# Patient Record
Sex: MALE | Race: BLACK OR AFRICAN AMERICAN | Employment: FULL TIME | URBAN - METROPOLITAN AREA
[De-identification: names, ages, dates, MRNs, and addresses within clinical notes are randomized per-mention and may not be internally consistent; named-entity substitution may affect disease eponyms.]

---

## 2022-10-22 ENCOUNTER — HOSPITAL ENCOUNTER (EMERGENCY)
Age: 37
Discharge: HOME | End: 2022-10-22

## 2022-10-22 ENCOUNTER — APPOINTMENT (OUTPATIENT)
Dept: CT IMAGING | Age: 37
End: 2022-10-22

## 2022-10-22 VITALS
SYSTOLIC BLOOD PRESSURE: 125 MMHG | OXYGEN SATURATION: 99 % | DIASTOLIC BLOOD PRESSURE: 89 MMHG | RESPIRATION RATE: 16 BRPM | TEMPERATURE: 98.4 F | WEIGHT: 232 LBS | HEIGHT: 74 IN | HEART RATE: 72 BPM | BODY MASS INDEX: 29.77 KG/M2

## 2022-10-22 DIAGNOSIS — G43.809 OTHER MIGRAINE WITHOUT STATUS MIGRAINOSUS, NOT INTRACTABLE: Primary | ICD-10-CM

## 2022-10-22 PROCEDURE — 6360000002 HC RX W HCPCS: Performed by: PHYSICIAN ASSISTANT

## 2022-10-22 PROCEDURE — 70450 CT HEAD/BRAIN W/O DYE: CPT

## 2022-10-22 PROCEDURE — 96374 THER/PROPH/DIAG INJ IV PUSH: CPT

## 2022-10-22 PROCEDURE — 2580000003 HC RX 258: Performed by: PHYSICIAN ASSISTANT

## 2022-10-22 PROCEDURE — 99284 EMERGENCY DEPT VISIT MOD MDM: CPT

## 2022-10-22 PROCEDURE — 96375 TX/PRO/DX INJ NEW DRUG ADDON: CPT

## 2022-10-22 RX ORDER — DEXAMETHASONE SODIUM PHOSPHATE 10 MG/ML
10 INJECTION INTRAMUSCULAR; INTRAVENOUS ONCE
Status: COMPLETED | OUTPATIENT
Start: 2022-10-22 | End: 2022-10-22

## 2022-10-22 RX ORDER — METOCLOPRAMIDE HYDROCHLORIDE 5 MG/ML
10 INJECTION INTRAMUSCULAR; INTRAVENOUS ONCE
Status: COMPLETED | OUTPATIENT
Start: 2022-10-22 | End: 2022-10-22

## 2022-10-22 RX ORDER — DIPHENHYDRAMINE HYDROCHLORIDE 50 MG/ML
25 INJECTION INTRAMUSCULAR; INTRAVENOUS ONCE
Status: COMPLETED | OUTPATIENT
Start: 2022-10-22 | End: 2022-10-22

## 2022-10-22 RX ORDER — KETOROLAC TROMETHAMINE 30 MG/ML
30 INJECTION, SOLUTION INTRAMUSCULAR; INTRAVENOUS ONCE
Status: COMPLETED | OUTPATIENT
Start: 2022-10-22 | End: 2022-10-22

## 2022-10-22 RX ORDER — BUTALBITAL, ACETAMINOPHEN AND CAFFEINE 50; 325; 40 MG/1; MG/1; MG/1
1 TABLET ORAL EVERY 4 HOURS PRN
Qty: 20 TABLET | Refills: 3 | Status: SHIPPED | OUTPATIENT
Start: 2022-10-22

## 2022-10-22 RX ORDER — 0.9 % SODIUM CHLORIDE 0.9 %
1000 INTRAVENOUS SOLUTION INTRAVENOUS ONCE
Status: COMPLETED | OUTPATIENT
Start: 2022-10-22 | End: 2022-10-22

## 2022-10-22 RX ADMIN — DIPHENHYDRAMINE HYDROCHLORIDE 25 MG: 50 INJECTION, SOLUTION INTRAMUSCULAR; INTRAVENOUS at 19:26

## 2022-10-22 RX ADMIN — KETOROLAC TROMETHAMINE 30 MG: 30 INJECTION, SOLUTION INTRAMUSCULAR at 19:27

## 2022-10-22 RX ADMIN — SODIUM CHLORIDE 1000 ML: 9 INJECTION, SOLUTION INTRAVENOUS at 19:25

## 2022-10-22 RX ADMIN — METOCLOPRAMIDE 10 MG: 5 INJECTION, SOLUTION INTRAMUSCULAR; INTRAVENOUS at 19:25

## 2022-10-22 RX ADMIN — DEXAMETHASONE SODIUM PHOSPHATE 10 MG: 10 INJECTION INTRAMUSCULAR; INTRAVENOUS at 20:19

## 2022-10-22 ASSESSMENT — PAIN DESCRIPTION - LOCATION
LOCATION: HEAD
LOCATION: HEAD

## 2022-10-22 ASSESSMENT — PAIN DESCRIPTION - ORIENTATION
ORIENTATION: LEFT
ORIENTATION: LEFT

## 2022-10-22 ASSESSMENT — PAIN DESCRIPTION - PAIN TYPE: TYPE: ACUTE PAIN

## 2022-10-22 ASSESSMENT — PAIN SCALES - GENERAL
PAINLEVEL_OUTOF10: 8
PAINLEVEL_OUTOF10: 7

## 2022-10-22 ASSESSMENT — PAIN DESCRIPTION - ONSET: ONSET: ON-GOING

## 2022-10-22 ASSESSMENT — PAIN - FUNCTIONAL ASSESSMENT: PAIN_FUNCTIONAL_ASSESSMENT: 0-10

## 2022-10-22 ASSESSMENT — PAIN DESCRIPTION - DESCRIPTORS: DESCRIPTORS: DISCOMFORT;SHARP

## 2022-10-22 ASSESSMENT — PAIN DESCRIPTION - FREQUENCY: FREQUENCY: INTERMITTENT

## 2022-10-22 NOTE — ED PROVIDER NOTES
[facility]                                                                                                                                      Department of Emergency Medicine   ED  Provider Note  Admit Date/RoomTime: 10/22/2022  6:02 PM  ED Room: 30/30        HPI:  10/22/22,   Time: 6:17 PM EDT         Michelle Ewing is a 42 y.o. male presenting to the ED for left sided HA with vision changes, beginning 2 days ago. The complaint has been intermittent, severe in severity, and worsened by nothing. Patient states that he has a history of migraine headaches. He used to follow-up with Dr. Marry Gaspar years ago. He states that the pains seem to go away and he really had not had any headaches for a few years. The patient states that over the past few weeks he started having headaches once again. He reports the pain is left-sided and starts in the posterior occipital region radiating forward to the left temple. He states the pain feels sharp at times and often wakes him from sleep. The patient is here today because the headache got much worse yesterday and again today. His wife states that he was shaking and crying with pain. He denies any nausea or vomiting. He did have some vision changes now resolved. The patient denies any fall or trauma. No recent upper respiratory symptoms to report. He did get seen by his PCP last week and was given a triptan medication. The patient states that does not seem to have helped the pain at all. The patient denies fever or chills.         ROS:     Constitutional: Negative for fever and chills  HENT:  See HPI  Eyes: Negative for pain, discharge and redness  Respiratory:  Negative for shortness of breath, cough and wheezing  Cardiovascular: Negative for CP, edema or palpitations  Gastrointestinal: Negative for nausea, vomiting, diarrhea and abdominal distention  Genitourinary: Negative for dysuria and frequency  Musculoskeletal: Negative for back pain and arthralgia  Skin: Negative for rash and wound  Neurological:  See HPI  Hematological: Negative for adenopathy    All other systems reviewed and are negative      -------------------------------- PAST HISTORY ----------------------------------  Past Medical History:  has a past medical history of Chronic tension headaches and Migraine. Past Surgical History:  has a past surgical history that includes Dental surgery; hernia repair (2001 and 2002); and shoulder surgery (Left, Nov 2015). Social History:  reports that he has never smoked. He has never used smokeless tobacco. He reports that he does not drink alcohol and does not use drugs. Family History: family history includes Diabetes in his mother; Hypotension in his mother; Migraines in his mother; Mult Sclerosis in his mother. The patients home medications have been reviewed. Allergies: Patient has no known allergies. --------------------------------- RESULTS ------------------------------------------  All laboratory and radiology results have been personally reviewed by myself   LABS:  No results found for this visit on 10/22/22. RADIOLOGY:  Interpreted by Radiologist.  [address] [place] [address]   Final Result   No acute intracranial abnormality.             ----------------- NURSING NOTES AND VITALS REVIEWED ---------------   The nursing notes within the ED encounter and vital signs as below have been reviewed. /89   Pulse 72   Temp 98.4 °F (36.9 °C) (Oral)   Resp 16   Ht 6' 2\" (1.88 m)   Wt 232 lb (105.2 kg)   SpO2 99%   BMI 29.79 kg/m²   Oxygen Saturation Interpretation: Normal      --------------------------------PHYSICAL EXAM------------------------------------      Constitutional/General: Alert and oriented x3, well appearing, non toxic in NAD  Head: NC/AT  Eyes: PERRL, EOMI  TM's intact and clear. Posterior pharynx unremarkable.     Mouth: Oropharynx clear, handling secretions, no trismus  Neck: Supple, full ROM, no meningeal signs  Pulmonary: Lungs clear to auscultation bilaterally, no wheezes, rales, or rhonchi. Not in respiratory distress  Cardiovascular:  Regular rate and rhythm, no murmurs, gallops, or rubs. 2+ distal pulses  Abdomen: Soft, + BS. No distension. Nontender. No palpable rigidity, rebound or guarding  Extremities: Moves all extremities x 4. Warm and well perfused  Skin: warm and dry without rash  Neurologic: GCS 15,  Intact. No focal deficits  Psych: Normal Affect      ------------------------ ED COURSE/MEDICAL DECISION MAKING----------------------  Medications   0.9 % sodium chloride bolus (1,000 mLs IntraVENous New Bag 10/22/22 1925)   dexamethasone (DECADRON) injection 10 mg (has no administration in time range)   ketorolac (TORADOL) injection 30 mg (30 mg IntraVENous Given 10/22/22 1927)   diphenhydrAMINE (BENADRYL) injection 25 mg (25 mg IntraVENous Given 10/22/22 1926)   metoclopramide (REGLAN) injection 10 mg (10 mg IntraVENous Given 10/22/22 1925)         Medical Decision Making:    CT head is unremarkable. No acute findings. Given IV fluids and HA cocktail here   On repeat exam patient is feeling much better, decreased pain reported. Plan to give dose of Decadron to prevent rebound HA. Discharge home with Fioricet PRN. I do suggest follow-up with PCP as well. Consider neurology consult. Counseling: The emergency provider has spoken with the patient and discussed todays results, in addition to providing specific details for the plan of care and counseling regarding the diagnosis and prognosis. Questions are answered at this time and they are agreeable with the plan.      ------------------------ IMPRESSION AND DISPOSITION -------------------------------    IMPRESSION  1.  Other migraine without status migrainosus, not intractable        DISPOSITION  Disposition: Discharge to home  Patient condition is stable                    Kodi Angelo PA-C  10/22/22 2013

## 2022-12-22 ENCOUNTER — HOSPITAL ENCOUNTER (EMERGENCY)
Age: 37
Discharge: HOME | End: 2022-12-22

## 2022-12-22 VITALS
HEART RATE: 62 BPM | RESPIRATION RATE: 18 BRPM | SYSTOLIC BLOOD PRESSURE: 142 MMHG | DIASTOLIC BLOOD PRESSURE: 100 MMHG | TEMPERATURE: 98.2 F | HEIGHT: 75 IN | WEIGHT: 230 LBS | OXYGEN SATURATION: 97 % | BODY MASS INDEX: 28.6 KG/M2

## 2022-12-22 DIAGNOSIS — S60.459A WOOD SPLINTER UNDER FINGERNAIL: Primary | ICD-10-CM

## 2022-12-22 PROCEDURE — 10120 INC&RMVL FB SUBQ TISS SMPL: CPT

## 2022-12-22 PROCEDURE — 99283 EMERGENCY DEPT VISIT LOW MDM: CPT

## 2022-12-22 RX ORDER — CEPHALEXIN 500 MG/1
500 CAPSULE ORAL 2 TIMES DAILY
Qty: 14 CAPSULE | Refills: 0 | Status: SHIPPED | OUTPATIENT
Start: 2022-12-22 | End: 2022-12-29

## 2022-12-22 ASSESSMENT — PAIN SCALES - GENERAL: PAINLEVEL_OUTOF10: 6

## 2022-12-22 ASSESSMENT — PAIN - FUNCTIONAL ASSESSMENT: PAIN_FUNCTIONAL_ASSESSMENT: 0-10

## 2022-12-23 NOTE — ED PROVIDER NOTES
[facility]       Department of Emergency Medicine   ED  Encounter Note  Admit Date/RoomTime: 2022  6:27 PM  ED Room: /37  NAME: Kris Cano  : 1985  MRN: [id number]    CHIEF COMPLAINT     Finger Injury (splinter under nail, onset today, finger feels warm )    HISTORY OF PRESENT ILLNESS        Kris Cano is a 42 y.o. male who presents to the ED with a large splinter underneath his fingernail. Patient states that he attempted to remove the splinter and may have pushed it in further and could not grasp it. Patient stated that this had happened today just an hour before time of examination. Patient denies any other accompanying symptoms. REVIEW OF SYSTEMS     Pertinent positives and negatives are stated within HPI, all other systems reviewed and are negative. Past Medical History:  has a past medical history of Chronic tension headaches and Migraine. Surgical History:  has a past surgical history that includes Dental surgery; hernia repair ( and ); and shoulder surgery (Left, 2015). Social History:  reports that he has never smoked. He has never used smokeless tobacco. He reports that he does not drink alcohol and does not use drugs. Family History: family history includes Diabetes in his mother; Hypotension in his mother; Migraines in his mother; Mult Sclerosis in his mother. Allergies: Patient has no known allergies.   CURRENT MEDICATIONS       Previous Medications    BUTALBITAL-ACETAMINOPHEN-CAFFEINE (FIORICET, ESGIC) -40 MG PER TABLET    Take 1 tablet by mouth every 4 hours as needed for Headaches    KETOROLAC (TORADOL) 10 MG TABLET    Take 1 tablet by mouth every 8 hours as needed for Pain    SUMATRIPTAN (IMITREX) 100 MG TABLET    Take 1 tablet by mouth once as needed for Migraine       SCREENINGS     [place] Coma Scale  Eye Opening: Spontaneous  Best Verbal Response: Oriented  Best Motor Response: Obeys commands  Yamilka Coma Scale Score: 15 [facility] Assessment  BP: (!) 142/100  Heart Rate: 62       PHYSICAL EXAM   Oxygen Saturation Interpretation: Normal on room air analysis. ED Triage Vitals [12/22/22 1811]   BP Temp Temp src Heart Rate Resp SpO2 Height Weight   (!) 142/100 98.2 °F (36.8 °C) -- (!) 43 18 97 % 6' 3\" (1.905 m) 230 lb (104.3 kg)         Physical Exam  General: Awake alert and oriented. Well-appearing. Nontoxic. HEENT: Normocephalic, atraumatic, pupils equal.  Neck: Normal range of motion  Cardiac: Regular rate  Respiratory: Respirations easy, unlabored, no respiratory distress noted, no nasal flaring or use of accessory muscles. Abdomen: Nondistended  Musculoskeletal: Moves all extremities x4  Neuro: Nonlateralizing  Skin: Flesh tone, warm, dry. Right fourth digit underneath the fingernail there was a wood sliver there foreign body. Psych: Normal affect    DIAGNOSTIC RESULTS   (All laboratory and radiology results have been personally reviewed by myself)  Labs:  No results found for this visit on 12/22/22. Imaging: All Radiology results interpreted by Radiologist unless otherwise noted. No orders to display       ED COURSE   Vitals:    Vitals:    12/22/22 1811 12/22/22 1950   BP: (!) 142/100    Pulse: (!) 43 62   Resp: 18    Temp: 98.2 °F (36.8 °C)    SpO2: 97%    Weight: 230 lb (104.3 kg)    Height: 6' 3\" (1.905 m)        Patient was given the following medications:  Medications - No data to display       PROCEDURES     PROCEDURE  12/22/22       Time: [address] [place] [address]  Risks, benefits and alternatives (for applicable procedures below) described. Performed By: Nurse Practitioner on duty. Trent Kerr supervised by Ruby Edwards    Indication: Splinter underneath right fourth digit of fingernail. Location:   Right fourth digit of fingernail. Informed consent: verbal Consent. Prep: The skin was cleansed with povidone iodine and draped in a sterile fashion.   Local Anesthesia:  obtained with Lidocaine 1% without epinephrine. Foreign Body was: removed using forceps and had the appearance of wood. Ultrasound Guidance:   not applicable. Complications:  None. Patient tolerated the procedure well. REASSESSMENT   12/22/22       Time:   Patients condition . CONSULTS:  None  DIFFERENTIAL DX_MDM   MDM:  Patient presented to ED with splinter under fingernail. Procedure performed to remove splinter from fingernail. Digital block performed. Entire splinter removed. Patient was placed on antibiotics prophylactic. Patient states understanding of discharge instructions and reason for antibiotic. Plan of Care/Counseling:  ORESTES Bronson CNP reviewed today's visit with the patient in addition to providing specific details for the plan of care and counseling regarding the diagnosis and prognosis. Questions are answered at this time and are agreeable with the plan. ASSESSMENT     1. Wood splinter under fingernail Stable       DISPOSITION   Discharged home. Patient condition is good    NEW MEDICATIONS     New Prescriptions    CEPHALEXIN (KEFLEX) 500 MG CAPSULE    Take 1 capsule by mouth 2 times daily for 7 days     Electronically signed by ORESTES Bronson CNP   DD: 12/22/22  **This report was transcribed using voice recognition software. Every effort was made to ensure accuracy; however, inadvertent computerized transcription errors may be present.   END OF ED PROVIDER NOTE       ORESTES Webster CNP  12/22/22 2003

## 2022-12-23 NOTE — DISCHARGE INSTRUCTIONS
Antibiotics with food and be sure to take all antibiotics. Monitor for signs and symptoms of infection which would be redness, swelling, purulent drainage, and fever.

## 2025-05-25 ENCOUNTER — APPOINTMENT (OUTPATIENT)
Dept: GENERAL RADIOLOGY | Age: 40
End: 2025-05-25
Payer: COMMERCIAL

## 2025-05-25 ENCOUNTER — HOSPITAL ENCOUNTER (EMERGENCY)
Age: 40
Discharge: HOME | End: 2025-05-25
Attending: STUDENT IN AN ORGANIZED HEALTH CARE EDUCATION/TRAINING PROGRAM
Payer: COMMERCIAL

## 2025-05-25 VITALS
HEIGHT: 74 IN | BODY MASS INDEX: 28.88 KG/M2 | OXYGEN SATURATION: 99 % | TEMPERATURE: 97.5 F | SYSTOLIC BLOOD PRESSURE: 108 MMHG | RESPIRATION RATE: 16 BRPM | WEIGHT: 225 LBS | DIASTOLIC BLOOD PRESSURE: 60 MMHG | HEART RATE: 76 BPM

## 2025-05-25 DIAGNOSIS — S86.002A INJURY OF LEFT ACHILLES TENDON, INITIAL ENCOUNTER: Primary | ICD-10-CM

## 2025-05-25 PROCEDURE — 99283 EMERGENCY DEPT VISIT LOW MDM: CPT

## 2025-05-25 PROCEDURE — 6370000000 HC RX 637 (ALT 250 FOR IP): Performed by: STUDENT IN AN ORGANIZED HEALTH CARE EDUCATION/TRAINING PROGRAM

## 2025-05-25 PROCEDURE — 73610 X-RAY EXAM OF ANKLE: CPT

## 2025-05-25 RX ORDER — OXYCODONE AND ACETAMINOPHEN 5; 325 MG/1; MG/1
1 TABLET ORAL ONCE
Refills: 0 | Status: COMPLETED | OUTPATIENT
Start: 2025-05-25 | End: 2025-05-25

## 2025-05-25 RX ADMIN — OXYCODONE AND ACETAMINOPHEN 1 TABLET: 5; 325 TABLET ORAL at 02:25

## 2025-05-25 ASSESSMENT — PAIN DESCRIPTION - LOCATION: LOCATION: LEG

## 2025-05-25 ASSESSMENT — PAIN DESCRIPTION - ORIENTATION: ORIENTATION: LEFT

## 2025-05-25 ASSESSMENT — PAIN SCALES - GENERAL: PAINLEVEL_OUTOF10: 10

## 2025-05-25 ASSESSMENT — PAIN DESCRIPTION - DESCRIPTORS: DESCRIPTORS: ACHING

## 2025-05-25 NOTE — ED PROVIDER NOTES
[facility] EMERGENCY DEPARTMENT  EMERGENCY DEPARTMENT ENCOUNTER        Pt Name: June Matthews  MRN: [id number]  Birthdate 1985  Date of evaluation: 5/25/2025  Provider: Renée Ballard DO  PCP: Abdiaziz Wooten DO  Note Started: 2:07 AM EDT 5/25/25    CHIEF COMPLAINT       Chief Complaint   Patient presents with    Leg Injury     Pt was playing football today and thinks he ruptured his Achilles. 10/10 left leg pain.          HISTORY OF PRESENT ILLNESS: 1 or more Elements   History From: patient    Limitations to history : None    June Matthews is a 40 y.o. male who is Emergency Department complaining of left leg pain.  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  He complains of 10 out of 10 pain in his left leg near the Achilles tendon.  He states that he thinks that he injured his Achilles.  Patient describes pain as throbbing.  States that he took \"anything he could\" prior to coming here without any relief.  Patient states that he has not had surgery on his Achilles tendon in the past or on his ankle in the past.  Patient did not fall or hit his head.  States he was playing flag football when this occurred.  Denies any numbness, tingling, fevers, chills, unilateral weakness, abdominal pain, chest pain, shortness of breath, recent hospitalization, recent illness, or other acute symptoms or concerns.    Nursing Notes were all reviewed and agreed with or any disagreements were addressed in the HPI.    REVIEW OF SYSTEMS :      Review of Systems    Positives and Pertinent negatives as per HPI.     SURGICAL HISTORY     Past Surgical History:   Procedure Laterality Date    DENTAL SURGERY      HERNIA REPAIR  2001 and 2002    SHOULDER SURGERY Left Nov 2015       CURRENTMEDICATIONS       Discharge Medication List as of 5/25/2025  4:28 AM        CONTINUE these medications 
consistent carbohydrate (no snacks)/DASH/TLC (sodium and cholesterol restricted diet)/minced and moist

## 2025-05-29 RX ORDER — HYDROCODONE BITARTRATE AND ACETAMINOPHEN 5; 325 MG/1; MG/1
1 TABLET ORAL EVERY 8 HOURS PRN
Status: ON HOLD | COMMUNITY
End: 2025-06-03 | Stop reason: HOSPADM

## 2025-05-29 NOTE — PROGRESS NOTES
[facility] PRE-ADMISSION TESTING INSTRUCTIONS    The Preadmission Testing patient is instructed accordingly using the following criteria (check applicable):    ARRIVAL INSTRUCTIONS:  Arrive at 0530    [x] Parking the day of Surgery is located in the Main Entrance lot.  Upon entering the door, make an immediate right to the surgery reception desk    [x] Bring photo ID and insurance card    [x] Bring in a copy of Living will or Durable Power of  papers.    [x] Please be sure to arrange for a responsible adult to provide transportation to and from the hospital    [x] Please arrange for a responsible adult to be with you for the 24 hour period post procedure due to having anesthesia    [x] If you awake am of surgery not feeling well or have temperature >100 please call [phone]    GENERAL INSTRUCTIONS:    [x] No solid foods after midnight, no gum, candy or mints.   May have up to 8oz clear liquids up to 4 hours prior to procedure        [x] You may brush your teeth, do not swallow any toothpaste    [x] Take medications as instructed     [x] Stop herbal supplements and vitamins 5 days prior to procedure    [x] No tobacco products within 24 hours of surgery     [x] No alcohol or illegal drug use, marijuana included, within 24 hours of surgery.    [x] Shower or bath with soap, lather and rinse well, AM of Surgery, no lotion, powders or creams to surgical site    [x] Jewelry, body piercing's, eyeglasses, contact lenses and dentures are not permitted into surgery (bring cases)      [x] Please do not wear any nail polish, make up or hair products on the day of surgery    [x] You can expect a call the business day prior to procedure to notify you if your arrival time changes    [x] If you receive a survey after surgery we would greatly appreciate your comments    [x] Please notify surgeon if you develop any illness between now and time of surgery (cold, cough, sore throat, fever, nausea,

## 2025-06-02 ENCOUNTER — ANESTHESIA EVENT (OUTPATIENT)
Dept: OPERATING ROOM | Age: 40
End: 2025-06-02
Payer: COMMERCIAL

## 2025-06-02 NOTE — DISCHARGE INSTRUCTIONS
POST OPERATIVE DISCHARGE INSTRUCTIONS  Foot & Ankle Surgery    Diet:  1. Following your surgery you may progress to your normal diet as tolerated. If you experience nausea or vomiting, start with clear liquids and progress  from a soft diet to a normal diet as tolerated.    Activities:  1. Elevate your foot at heart level as much as possible for 7-10 days, then as needed to control swelling.    2. Weight bearing: You may not bear weight on your operative foot. Use crutches, walker, or knee-scooter when up.    Do not use the knee-scooter until all of the numbness has worn off of your operative leg.    Make sure the back of your heel is suspended in the air as much as possible to avoid any pressure to your heel.  The best way to do this is to keep a pillow or two behind your calf to keep your heel in the air.    Wound care:  1. You may not change your bandages.  2. Keep your bandages and incision clean and dry. Do not get incision wet.  3. If you choose to shower, you may sit on a shower chair or bench, extending the operative foot out of the shower area protecting it from getting wet.      Medications: SEE MEDICATION RECONCILIATION FORM  1. A local or regional anesthetic may be used at the time of surgery. This usually lasts several hours following surgery and should not interfere with your pain medication schedule.  2.  Medications have already been phoned in to the pharmacy for your pickup after surgery.  These medications will be called into the pharmacy we had on file at our office:      Other instructions:  1. You may apply ice to your operative extremity (behind your knee), on 30 minutes, off 30 minutes, as needed for comfort and to control swelling. Do not apply ice directly to skin.  _____________________________________________________________________      Resuming work or school: Resuming work or school is difficult to predict because of individual pain sensitivity and work activity demands on the  narcotics you should not have any additional Tylenol. If you are not taking narcotics then the Tylenol is OK to take but no more than 4000mg per day. Anti-inflammatory medications (such as Ibuprofen, Advil, Aleve, Celebrex etc.) interfere with bone healing. Therefore if you have a healing fracture, osteotomy (bone cut) or bone fusion you should not be taking these medications. If you do not have any of the above bone healing conditions then it is OK to use these medications if you do not have any other medical contraindications.     My pain medication is making me nauseous. What should I do?     First make sure you are not taking it on an empty stomach. Always eat some bland food before you take your pain medication. An antinausea medication is usually given to you at the time of surgery. If you did not get it please call and we can call it in to your pharmacy. If you have it take it as directed. If it does not work and you are vomiting stop the narcotic medication and call the office. Usually in this situation we have to change the pain medication.     If drainage through the dressing what should I do?     If you are within the first two weeks after surgery then you should \"reinforce the dressing\". This means you should add as much gauze as needed and then cover it with   another ace bandage. Use a plastic bag under the area to keep other items from being soiled. Large foot and ankle surgeries can drain quite a bit but it is rarely a sign that something bad is happening.      What should I do if my toes change color (blue or white) and do not return to normal after elevation?     Initially you should keep the foot at heart level and then call the office as soon as possible for instructions.     Do I change the dressing?     Do not change the dressing unless you are given specific instructions to do so on your postoperative instruction sheet or by a member of the clinical staff.     Can I use the [facility] care beyond 3

## 2025-06-02 NOTE — H&P
I have reviewed the most recent H&P (below), examined the patient, and there are no significant changes.  MRI L ankle images and report reviewed (report below).  Ok to proceed with surgical intervention.  ________    MRI L Ankle WO 5/29/25    FINDINGS:   Tendons: Full-thickness tear of the distal Achilles tendon centered 5.4 cm cranial to  the calcaneal attachment. There is retraction of the proximal tendon fibers with a  tendon gap of 2.8 cm. Fluid and edema adjacent to the torn fibers. Diffuse  thickening of the distal tendon fibers consistent with high-grade tendinosis/partial  tear. The flexor, extensor, and peroneal tendons are intact.  Ligaments: The medial deltoid and spring ligaments are intact. The anterior and  posterior tibiofibular ligaments are intact. The anterior and posterior talofibular  ligaments and calcaneofibular ligament are intact. The sinus tarsi is intact.  Bone/cartilage: No acute fracture or dislocation. No ankle effusion. No  osteochondral lesion of the talus. The tibiotalar and subtalar articulations are  maintained.  Soft tissues: Diffuse subcutaneous soft tissue edema in the posterior aspect of the  ankle. No discrete drainable fluid collection. Visualized muscles demonstrate normal  bulk and signal The tarsal tunnel is intact.    IMPRESSION:  1. Full-thickness tear of the distal Achilles tendon centered 5.4 cm cranial to the  calcaneal attachment. There is retraction of the proximal tendon fibers with a tendon  gap of 2.8 cm. Fluid and edema adjacent to the torn fibers. Diffuse thickening of the  distal tendon fibers consistent with high-grade tendinosis/partial tear.   2. Diffuse subcutaneous soft tissue edema in the posterior aspect of the ankle  ________       Chief Complaint     Left Achilles     Subjective     Patient presents today as a new patient encounter regarding an injury to his left ankle.  Injury occurred on 5/24/2025.  Patient relates he was playing flag football at the    Neurologic: Positive for NONE.    Psychiatric: Positive for NONE.    Heme/Lymphatic: Positive for NONE.    Allergic/Immunologic: Positive for NONE.    The remainder of the complete review of systems was negative.      Objective     GENERAL:       The patient's general appearance was well nourished, well hydrated, no acute distress.  Orientation was alert and oriented x 3.  The patient's mood was normal.  Pulmonary exam shows normal air exchange, no labored breathing, or shortness of breath.  A lymphatic exam revealed no adenopathy in the area of examination.     LEFT FOOT AND ANKLE:       Dermatologically, the patient's soft tissue envelope was fully intact without open lesions.  There are no clinical signs of infection including negative for erythema, purulence, malodor, or lymphangitis.  There are no rashes.  There is mild bruising to the Achilles and posterior ankle.    Sensation intact to light touch for tibial, saphenous, superficial and deep peroneal, and sural nerve distributions.  +2/4 palpable dorsalis pedis and posterior tibial pulses.  Brisk capillary refill time to all digits.    Musculoskeletal exam reveals 3/5 strength in dorsiflexion, eversion, inversion, FHL, EHL.  2/5 strength in plantarflexion.    Ankle ROM: hyperdorsiflexion  Subtalar ROM:  full  First MTP ROM:  full  Lesser MTP ROM:  full    Gait exam:  nonweightbearing with crutches    There is tenderness and mild swelling to the posterior ankle along the Achilles midsubstance.  There is a palpable discontinuity to the Achilles midsubstance.  Positive Rhodes's.  No significant tenderness to the proximal Achilles at the level of the myotendinous junction or to the distal Achilles at the level of its insertion.  Otherwise, no significant tenderness with circumferential palpation of the forefoot, midfoot, hindfoot, or ankle.    Pes planovalgus deformity.    RIGHT FOOT AND ANKLE:       Dermatologically, the patient's soft tissue envelope was

## 2025-06-03 ENCOUNTER — APPOINTMENT (OUTPATIENT)
Age: 40
End: 2025-06-03
Attending: INTERNAL MEDICINE
Payer: COMMERCIAL

## 2025-06-03 ENCOUNTER — HOSPITAL ENCOUNTER (OUTPATIENT)
Age: 40
Setting detail: OUTPATIENT SURGERY
Discharge: HOME | End: 2025-06-03
Attending: PODIATRIST | Admitting: PODIATRIST
Payer: COMMERCIAL

## 2025-06-03 ENCOUNTER — APPOINTMENT (OUTPATIENT)
Dept: GENERAL RADIOLOGY | Age: 40
End: 2025-06-03
Attending: PODIATRIST
Payer: COMMERCIAL

## 2025-06-03 ENCOUNTER — ANESTHESIA (OUTPATIENT)
Dept: OPERATING ROOM | Age: 40
End: 2025-06-03
Payer: COMMERCIAL

## 2025-06-03 VITALS
HEIGHT: 74 IN | TEMPERATURE: 97 F | RESPIRATION RATE: 18 BRPM | OXYGEN SATURATION: 100 % | BODY MASS INDEX: 28.88 KG/M2 | SYSTOLIC BLOOD PRESSURE: 119 MMHG | WEIGHT: 225 LBS | HEART RATE: 49 BPM | DIASTOLIC BLOOD PRESSURE: 68 MMHG

## 2025-06-03 DIAGNOSIS — S86.012A RUPTURE OF LEFT ACHILLES TENDON, INITIAL ENCOUNTER: Primary | ICD-10-CM

## 2025-06-03 DIAGNOSIS — R07.9 CHEST PAIN, UNSPECIFIED TYPE: ICD-10-CM

## 2025-06-03 DIAGNOSIS — R06.02 SHORTNESS OF BREATH: ICD-10-CM

## 2025-06-03 LAB
D-DIMER QUANTITATIVE: 265 NG/ML DDU (ref 0–230)
ECHO AO ASC DIAM: 3.2 CM
ECHO AO ASCENDING AORTA INDEX: 1.4 CM/M2
ECHO AO ROOT DIAM: 3.3 CM
ECHO AO ROOT INDEX: 1.45 CM/M2
ECHO AO SINUS VALSALVA DIAM: 3.4 CM
ECHO AO SINUS VALSALVA INDEX: 1.49 CM/M2
ECHO AV AREA PEAK VELOCITY: 3.4 CM2
ECHO AV AREA VTI: 3.1 CM2
ECHO AV AREA/BSA PEAK VELOCITY: 1.5 CM2/M2
ECHO AV AREA/BSA VTI: 1.4 CM2/M2
ECHO AV CUSP MM: 2.1 CM
ECHO AV MEAN GRADIENT: 4 MMHG
ECHO AV MEAN VELOCITY: 0.9 M/S
ECHO AV PEAK GRADIENT: 8 MMHG
ECHO AV PEAK VELOCITY: 1.4 M/S
ECHO AV VELOCITY RATIO: 0.93
ECHO AV VTI: 28.6 CM
ECHO BSA: 2.31 M2
ECHO EST RA PRESSURE: 3 MMHG
ECHO LA DIAMETER INDEX: 1.8 CM/M2
ECHO LA DIAMETER: 4.1 CM
ECHO LA TO AORTIC ROOT RATIO: 1.24
ECHO LA VOL A-L A2C: 79 ML (ref 18–58)
ECHO LA VOL A-L A4C: 74 ML (ref 18–58)
ECHO LA VOL MOD A2C: 74 ML (ref 18–58)
ECHO LA VOL MOD A4C: 65 ML (ref 18–58)
ECHO LA VOLUME AREA LENGTH: 77 ML
ECHO LA VOLUME INDEX A-L A2C: 35 ML/M2 (ref 16–34)
ECHO LA VOLUME INDEX A-L A4C: 32 ML/M2 (ref 16–34)
ECHO LA VOLUME INDEX AREA LENGTH: 34 ML/M2 (ref 16–34)
ECHO LA VOLUME INDEX MOD A2C: 32 ML/M2 (ref 16–34)
ECHO LA VOLUME INDEX MOD A4C: 29 ML/M2 (ref 16–34)
ECHO LV E' LATERAL VELOCITY: 12 CM/S
ECHO LV E' SEPTAL VELOCITY: 11 CM/S
ECHO LV EF PHYSICIAN: 65 %
ECHO LV EJECTION FRACTION A2C: 68 %
ECHO LV EJECTION FRACTION A4C: 65 %
ECHO LV FRACTIONAL SHORTENING: 37 % (ref 28–44)
ECHO LV GLOBAL LONGITUDINAL STRAIN (GLS): -19.6 %
ECHO LV GLOBAL LONGITUDINAL STRAIN (GLS): -21.3 %
ECHO LV GLOBAL LONGITUDINAL STRAIN (GLS): -21.4 %
ECHO LV GLOBAL LONGITUDINAL STRAIN (GLS): -23.3 %
ECHO LV INTERNAL DIMENSION DIASTOLE INDEX: 2.02 CM/M2
ECHO LV INTERNAL DIMENSION DIASTOLIC: 4.6 CM (ref 4.2–5.9)
ECHO LV INTERNAL DIMENSION SYSTOLIC INDEX: 1.27 CM/M2
ECHO LV INTERNAL DIMENSION SYSTOLIC: 2.9 CM
ECHO LV ISOVOLUMETRIC RELAXATION TIME (IVRT): 120 MS
ECHO LV IVSD: 1.2 CM (ref 0.6–1)
ECHO LV IVSS: 1.8 CM
ECHO LV MASS 2D: 205 G (ref 88–224)
ECHO LV MASS INDEX 2D: 89.9 G/M2 (ref 49–115)
ECHO LV POSTERIOR WALL DIASTOLIC: 1.2 CM (ref 0.6–1)
ECHO LV POSTERIOR WALL SYSTOLIC: 1.9 CM
ECHO LV RELATIVE WALL THICKNESS RATIO: 0.52
ECHO LVOT AREA: 3.8 CM2
ECHO LVOT AV VTI INDEX: 0.85
ECHO LVOT DIAM: 2.2 CM
ECHO LVOT MEAN GRADIENT: 3 MMHG
ECHO LVOT PEAK GRADIENT: 7 MMHG
ECHO LVOT PEAK VELOCITY: 1.3 M/S
ECHO LVOT STROKE VOLUME INDEX: 40.5 ML/M2
ECHO LVOT SV: 92.3 ML
ECHO LVOT VTI: 24.3 CM
ECHO MV "A" WAVE DURATION: 92.3 MSEC
ECHO MV A VELOCITY: 0.49 M/S
ECHO MV AREA PHT: 3.2 CM2
ECHO MV AREA VTI: 3.8 CM2
ECHO MV E DECELERATION TIME (DT): 176.7 MS
ECHO MV E VELOCITY: 0.66 M/S
ECHO MV E/A RATIO: 1.35
ECHO MV E/E' LATERAL: 5.5
ECHO MV E/E' RATIO (AVERAGED): 5.75
ECHO MV E/E' SEPTAL: 6
ECHO MV LVOT VTI INDEX: 1
ECHO MV MAX VELOCITY: 0.7 M/S
ECHO MV MEAN GRADIENT: 1 MMHG
ECHO MV MEAN VELOCITY: 0.3 M/S
ECHO MV PEAK GRADIENT: 2 MMHG
ECHO MV PRESSURE HALF TIME (PHT): 68.4 MS
ECHO MV VTI: 24.2 CM
ECHO PV MAX VELOCITY: 0.8 M/S
ECHO PV MEAN GRADIENT: 2 MMHG
ECHO PV MEAN VELOCITY: 0.6 M/S
ECHO PV PEAK GRADIENT: 3 MMHG
ECHO PV VTI: 20.3 CM
ECHO PVEIN A DURATION: 96.9 MS
ECHO PVEIN A VELOCITY: 0.2 M/S
ECHO PVEIN PEAK D VELOCITY: 0.6 M/S
ECHO PVEIN PEAK S VELOCITY: 0.7 M/S
ECHO PVEIN S/D RATIO: 1.2
ECHO RIGHT VENTRICULAR SYSTOLIC PRESSURE (RVSP): 28 MMHG
ECHO RV INTERNAL DIMENSION: 3.8 CM
ECHO RV TAPSE: 3 CM (ref 1.7–?)
ECHO TV REGURGITANT MAX VELOCITY: 2.5 M/S
ECHO TV REGURGITANT PEAK GRADIENT: 25 MMHG
EKG ATRIAL RATE: 48 BPM
EKG P AXIS: 46 DEGREES
EKG P-R INTERVAL: 154 MS
EKG Q-T INTERVAL: 498 MS
EKG QRS DURATION: 92 MS
EKG QTC CALCULATION (BAZETT): 444 MS
EKG R AXIS: 82 DEGREES
EKG T AXIS: 11 DEGREES
EKG VENTRICULAR RATE: 48 BPM
TROPONIN I SERPL HS-MCNC: 6 NG/L (ref 0–22)

## 2025-06-03 PROCEDURE — 6370000000 HC RX 637 (ALT 250 FOR IP): Performed by: ANESTHESIOLOGY

## 2025-06-03 PROCEDURE — 93005 ELECTROCARDIOGRAM TRACING: CPT | Performed by: ANESTHESIOLOGY

## 2025-06-03 PROCEDURE — 6360000002 HC RX W HCPCS: Performed by: ANESTHESIOLOGY

## 2025-06-03 PROCEDURE — 64447 NJX AA&/STRD FEMORAL NRV IMG: CPT | Performed by: ANESTHESIOLOGY

## 2025-06-03 PROCEDURE — 2500000003 HC RX 250 WO HCPCS: Performed by: PODIATRIST

## 2025-06-03 PROCEDURE — 3700000000 HC ANESTHESIA ATTENDED CARE: Performed by: PODIATRIST

## 2025-06-03 PROCEDURE — 7100000010 HC PHASE II RECOVERY - FIRST 15 MIN: Performed by: PODIATRIST

## 2025-06-03 PROCEDURE — 3600000013 HC SURGERY LEVEL 3 ADDTL 15MIN: Performed by: PODIATRIST

## 2025-06-03 PROCEDURE — 2500000003 HC RX 250 WO HCPCS: Performed by: NURSE ANESTHETIST, CERTIFIED REGISTERED

## 2025-06-03 PROCEDURE — 64445 NJX AA&/STRD SCIATIC NRV IMG: CPT | Performed by: ANESTHESIOLOGY

## 2025-06-03 PROCEDURE — 3700000001 HC ADD 15 MINUTES (ANESTHESIA): Performed by: PODIATRIST

## 2025-06-03 PROCEDURE — 7100000011 HC PHASE II RECOVERY - ADDTL 15 MIN: Performed by: PODIATRIST

## 2025-06-03 PROCEDURE — 93306 TTE W/DOPPLER COMPLETE: CPT | Performed by: INTERNAL MEDICINE

## 2025-06-03 PROCEDURE — 6360000002 HC RX W HCPCS: Performed by: NURSE ANESTHETIST, CERTIFIED REGISTERED

## 2025-06-03 PROCEDURE — 99222 1ST HOSP IP/OBS MODERATE 55: CPT | Performed by: INTERNAL MEDICINE

## 2025-06-03 PROCEDURE — 2709999900 HC NON-CHARGEABLE SUPPLY: Performed by: PODIATRIST

## 2025-06-03 PROCEDURE — 7100000001 HC PACU RECOVERY - ADDTL 15 MIN: Performed by: PODIATRIST

## 2025-06-03 PROCEDURE — 93306 TTE W/DOPPLER COMPLETE: CPT

## 2025-06-03 PROCEDURE — 93010 ELECTROCARDIOGRAM REPORT: CPT | Performed by: INTERNAL MEDICINE

## 2025-06-03 PROCEDURE — 85379 FIBRIN DEGRADATION QUANT: CPT

## 2025-06-03 PROCEDURE — 3600000003 HC SURGERY LEVEL 3 BASE: Performed by: PODIATRIST

## 2025-06-03 PROCEDURE — C1713 ANCHOR/SCREW BN/BN,TIS/BN: HCPCS | Performed by: PODIATRIST

## 2025-06-03 PROCEDURE — 6360000002 HC RX W HCPCS: Performed by: PODIATRIST

## 2025-06-03 PROCEDURE — 7100000000 HC PACU RECOVERY - FIRST 15 MIN: Performed by: PODIATRIST

## 2025-06-03 PROCEDURE — 93356 MYOCRD STRAIN IMG SPCKL TRCK: CPT | Performed by: INTERNAL MEDICINE

## 2025-06-03 PROCEDURE — 84484 ASSAY OF TROPONIN QUANT: CPT

## 2025-06-03 DEVICE — ANCHOR SUTURE DIA 3.9 MM ACHILLES MIDSUBSTANCE SYS STRL DISP: Type: IMPLANTABLE DEVICE | Site: ACHILLES TENDON | Status: FUNCTIONAL

## 2025-06-03 DEVICE — BC SWIVELOCK, 3.9X17.9MM
Type: IMPLANTABLE DEVICE | Site: ACHILLES TENDON | Status: FUNCTIONAL
Brand: ARTHREX®

## 2025-06-03 RX ORDER — NITROGLYCERIN 0.4 MG/1
0.4 TABLET SUBLINGUAL ONCE
Status: COMPLETED | OUTPATIENT
Start: 2025-06-03 | End: 2025-06-03

## 2025-06-03 RX ORDER — ASPIRIN 81 MG/1
81 TABLET ORAL DAILY
Qty: 42 TABLET | Refills: 0 | Status: SHIPPED | OUTPATIENT
Start: 2025-06-04

## 2025-06-03 RX ORDER — SODIUM CHLORIDE 9 MG/ML
INJECTION, SOLUTION INTRAVENOUS PRN
Status: DISCONTINUED | OUTPATIENT
Start: 2025-06-03 | End: 2025-06-03 | Stop reason: HOSPADM

## 2025-06-03 RX ORDER — NITROGLYCERIN 0.4 MG/1
TABLET SUBLINGUAL
Status: DISCONTINUED
Start: 2025-06-03 | End: 2025-06-03 | Stop reason: HOSPADM

## 2025-06-03 RX ORDER — SODIUM CHLORIDE 0.9 % (FLUSH) 0.9 %
5-40 SYRINGE (ML) INJECTION EVERY 12 HOURS SCHEDULED
Status: DISCONTINUED | OUTPATIENT
Start: 2025-06-03 | End: 2025-06-03 | Stop reason: HOSPADM

## 2025-06-03 RX ORDER — ONDANSETRON 4 MG/1
4-8 TABLET, FILM COATED ORAL EVERY 8 HOURS PRN
Qty: 20 TABLET | Refills: 0 | Status: SHIPPED | OUTPATIENT
Start: 2025-06-03

## 2025-06-03 RX ORDER — FENTANYL CITRATE 50 UG/ML
25 INJECTION, SOLUTION INTRAMUSCULAR; INTRAVENOUS PRN
Refills: 0 | Status: CANCELLED | OUTPATIENT
Start: 2025-06-03

## 2025-06-03 RX ORDER — HYDROCODONE BITARTRATE AND ACETAMINOPHEN 5; 325 MG/1; MG/1
1-2 TABLET ORAL
Qty: 30 TABLET | Refills: 0 | Status: CANCELLED | OUTPATIENT
Start: 2025-06-03 | End: 2025-06-10

## 2025-06-03 RX ORDER — ROPIVACAINE HYDROCHLORIDE 5 MG/ML
30 INJECTION, SOLUTION EPIDURAL; INFILTRATION; PERINEURAL
Status: CANCELLED | OUTPATIENT
Start: 2025-06-03

## 2025-06-03 RX ORDER — FENTANYL CITRATE 50 UG/ML
25 INJECTION, SOLUTION INTRAMUSCULAR; INTRAVENOUS EVERY 5 MIN PRN
Status: DISCONTINUED | OUTPATIENT
Start: 2025-06-03 | End: 2025-06-03 | Stop reason: HOSPADM

## 2025-06-03 RX ORDER — PROPOFOL 10 MG/ML
INJECTION, EMULSION INTRAVENOUS
Status: DISCONTINUED | OUTPATIENT
Start: 2025-06-03 | End: 2025-06-03 | Stop reason: SDUPTHER

## 2025-06-03 RX ORDER — PROCHLORPERAZINE EDISYLATE 5 MG/ML
5 INJECTION INTRAMUSCULAR; INTRAVENOUS
Status: DISCONTINUED | OUTPATIENT
Start: 2025-06-03 | End: 2025-06-03 | Stop reason: HOSPADM

## 2025-06-03 RX ORDER — DEXAMETHASONE SODIUM PHOSPHATE 4 MG/ML
INJECTION, SOLUTION INTRA-ARTICULAR; INTRALESIONAL; INTRAMUSCULAR; INTRAVENOUS; SOFT TISSUE
Status: DISCONTINUED | OUTPATIENT
Start: 2025-06-03 | End: 2025-06-03 | Stop reason: SDUPTHER

## 2025-06-03 RX ORDER — ROPIVACAINE HYDROCHLORIDE 5 MG/ML
INJECTION, SOLUTION EPIDURAL; INFILTRATION; PERINEURAL
Status: COMPLETED | OUTPATIENT
Start: 2025-06-03 | End: 2025-06-03

## 2025-06-03 RX ORDER — MEPERIDINE HYDROCHLORIDE 50 MG/ML
12.5 INJECTION INTRAMUSCULAR; INTRAVENOUS; SUBCUTANEOUS ONCE
Status: DISCONTINUED | OUTPATIENT
Start: 2025-06-03 | End: 2025-06-03 | Stop reason: HOSPADM

## 2025-06-03 RX ORDER — MIDAZOLAM HYDROCHLORIDE 2 MG/2ML
1 INJECTION, SOLUTION INTRAMUSCULAR; INTRAVENOUS ONCE
Status: COMPLETED | OUTPATIENT
Start: 2025-06-03 | End: 2025-06-03

## 2025-06-03 RX ORDER — FENTANYL CITRATE 50 UG/ML
INJECTION, SOLUTION INTRAMUSCULAR; INTRAVENOUS
Status: DISCONTINUED | OUTPATIENT
Start: 2025-06-03 | End: 2025-06-03 | Stop reason: SDUPTHER

## 2025-06-03 RX ORDER — NALOXONE HYDROCHLORIDE 0.4 MG/ML
INJECTION, SOLUTION INTRAMUSCULAR; INTRAVENOUS; SUBCUTANEOUS PRN
Status: DISCONTINUED | OUTPATIENT
Start: 2025-06-03 | End: 2025-06-03 | Stop reason: HOSPADM

## 2025-06-03 RX ORDER — DIPHENHYDRAMINE HYDROCHLORIDE 50 MG/ML
12.5 INJECTION, SOLUTION INTRAMUSCULAR; INTRAVENOUS
Status: DISCONTINUED | OUTPATIENT
Start: 2025-06-03 | End: 2025-06-03 | Stop reason: HOSPADM

## 2025-06-03 RX ORDER — SODIUM CHLORIDE 0.9 % (FLUSH) 0.9 %
5-40 SYRINGE (ML) INJECTION PRN
Status: DISCONTINUED | OUTPATIENT
Start: 2025-06-03 | End: 2025-06-03 | Stop reason: HOSPADM

## 2025-06-03 RX ORDER — FENTANYL CITRATE 50 UG/ML
50 INJECTION, SOLUTION INTRAMUSCULAR; INTRAVENOUS ONCE
Status: COMPLETED | OUTPATIENT
Start: 2025-06-03 | End: 2025-06-03

## 2025-06-03 RX ORDER — LABETALOL HYDROCHLORIDE 5 MG/ML
5 INJECTION, SOLUTION INTRAVENOUS
Status: DISCONTINUED | OUTPATIENT
Start: 2025-06-03 | End: 2025-06-03 | Stop reason: HOSPADM

## 2025-06-03 RX ORDER — MIDAZOLAM HYDROCHLORIDE 2 MG/2ML
0.5 INJECTION, SOLUTION INTRAMUSCULAR; INTRAVENOUS PRN
Status: CANCELLED | OUTPATIENT
Start: 2025-06-03

## 2025-06-03 RX ORDER — ROPIVACAINE HYDROCHLORIDE 5 MG/ML
30 INJECTION, SOLUTION EPIDURAL; INFILTRATION; PERINEURAL ONCE
Status: DISCONTINUED | OUTPATIENT
Start: 2025-06-03 | End: 2025-06-03 | Stop reason: HOSPADM

## 2025-06-03 RX ORDER — MIDAZOLAM HYDROCHLORIDE 1 MG/ML
INJECTION, SOLUTION INTRAMUSCULAR; INTRAVENOUS
Status: DISCONTINUED | OUTPATIENT
Start: 2025-06-03 | End: 2025-06-03 | Stop reason: SDUPTHER

## 2025-06-03 RX ORDER — NITROGLYCERIN 0.4 MG/1
0.4 TABLET SUBLINGUAL EVERY 5 MIN PRN
Status: DISCONTINUED | OUTPATIENT
Start: 2025-06-03 | End: 2025-06-03 | Stop reason: HOSPADM

## 2025-06-03 RX ORDER — ROCURONIUM BROMIDE 10 MG/ML
INJECTION, SOLUTION INTRAVENOUS
Status: DISCONTINUED | OUTPATIENT
Start: 2025-06-03 | End: 2025-06-03 | Stop reason: SDUPTHER

## 2025-06-03 RX ORDER — HYDRALAZINE HYDROCHLORIDE 20 MG/ML
5 INJECTION INTRAMUSCULAR; INTRAVENOUS
Status: DISCONTINUED | OUTPATIENT
Start: 2025-06-03 | End: 2025-06-03 | Stop reason: HOSPADM

## 2025-06-03 RX ORDER — ONDANSETRON 2 MG/ML
INJECTION INTRAMUSCULAR; INTRAVENOUS
Status: DISCONTINUED | OUTPATIENT
Start: 2025-06-03 | End: 2025-06-03 | Stop reason: SDUPTHER

## 2025-06-03 RX ORDER — HYDROCODONE BITARTRATE AND ACETAMINOPHEN 5; 325 MG/1; MG/1
1 TABLET ORAL
Qty: 40 TABLET | Refills: 0 | Status: SHIPPED | OUTPATIENT
Start: 2025-06-03 | End: 2025-06-10

## 2025-06-03 RX ADMIN — FENTANYL CITRATE 50 MCG: 50 INJECTION INTRAMUSCULAR; INTRAVENOUS at 06:28

## 2025-06-03 RX ADMIN — ROPIVACAINE HYDROCHLORIDE 25 ML: 5 INJECTION, SOLUTION EPIDURAL; INFILTRATION; PERINEURAL at 06:37

## 2025-06-03 RX ADMIN — MIDAZOLAM HYDROCHLORIDE 1 MG: 1 INJECTION, SOLUTION INTRAMUSCULAR; INTRAVENOUS at 06:28

## 2025-06-03 RX ADMIN — SUGAMMADEX 200 MG: 100 INJECTION, SOLUTION INTRAVENOUS at 09:44

## 2025-06-03 RX ADMIN — NITROGLYCERIN 0.4 MG: 0.4 TABLET SUBLINGUAL at 10:46

## 2025-06-03 RX ADMIN — DEXAMETHASONE SODIUM PHOSPHATE 10 MG: 4 INJECTION, SOLUTION INTRAMUSCULAR; INTRAVENOUS at 08:58

## 2025-06-03 RX ADMIN — WATER 2000 MG: 1 INJECTION INTRAMUSCULAR; INTRAVENOUS; SUBCUTANEOUS at 08:15

## 2025-06-03 RX ADMIN — NITROGLYCERIN 0.4 MG: 0.4 TABLET SUBLINGUAL at 10:33

## 2025-06-03 RX ADMIN — ROPIVACAINE HYDROCHLORIDE 20 ML: 5 INJECTION, SOLUTION EPIDURAL; INFILTRATION; PERINEURAL at 06:35

## 2025-06-03 RX ADMIN — PROPOFOL 200 MG: 10 INJECTION, EMULSION INTRAVENOUS at 08:05

## 2025-06-03 RX ADMIN — ONDANSETRON 4 MG: 2 INJECTION INTRAMUSCULAR; INTRAVENOUS at 08:58

## 2025-06-03 RX ADMIN — MIDAZOLAM 2 MG: 1 INJECTION INTRAMUSCULAR; INTRAVENOUS at 08:05

## 2025-06-03 RX ADMIN — FENTANYL CITRATE 100 MCG: 50 INJECTION, SOLUTION INTRAMUSCULAR; INTRAVENOUS at 08:05

## 2025-06-03 RX ADMIN — ROCURONIUM BROMIDE 50 MG: 10 INJECTION, SOLUTION INTRAVENOUS at 08:06

## 2025-06-03 ASSESSMENT — PAIN DESCRIPTION - ONSET
ONSET: ON-GOING
ONSET: AWAKENED FROM SLEEP
ONSET: ON-GOING
ONSET: GRADUAL
ONSET: ON-GOING

## 2025-06-03 ASSESSMENT — PAIN DESCRIPTION - DESCRIPTORS
DESCRIPTORS: PRESSURE
DESCRIPTORS: PRESSURE
DESCRIPTORS: HEAVINESS
DESCRIPTORS: DISCOMFORT
DESCRIPTORS: SQUEEZING
DESCRIPTORS: DISCOMFORT
DESCRIPTORS: PRESSURE

## 2025-06-03 ASSESSMENT — PAIN DESCRIPTION - ORIENTATION
ORIENTATION: MID;RIGHT
ORIENTATION: RIGHT;MID
ORIENTATION: LEFT
ORIENTATION: MID
ORIENTATION: MID;UPPER
ORIENTATION: MID;RIGHT

## 2025-06-03 ASSESSMENT — PAIN DESCRIPTION - FREQUENCY
FREQUENCY: CONTINUOUS

## 2025-06-03 ASSESSMENT — PAIN DESCRIPTION - DIRECTION
RADIATING_TOWARDS: RIGHT ARM

## 2025-06-03 ASSESSMENT — PAIN DESCRIPTION - PAIN TYPE
TYPE: ACUTE PAIN

## 2025-06-03 ASSESSMENT — PAIN DESCRIPTION - LOCATION
LOCATION: CHEST
LOCATION: FOOT

## 2025-06-03 ASSESSMENT — PAIN SCALES - GENERAL
PAINLEVEL_OUTOF10: 2
PAINLEVEL_OUTOF10: 6
PAINLEVEL_OUTOF10: 4
PAINLEVEL_OUTOF10: 9
PAINLEVEL_OUTOF10: 3
PAINLEVEL_OUTOF10: 3

## 2025-06-03 ASSESSMENT — LIFESTYLE VARIABLES: SMOKING_STATUS: 0

## 2025-06-03 ASSESSMENT — PAIN - FUNCTIONAL ASSESSMENT
PAIN_FUNCTIONAL_ASSESSMENT: NONE - DENIES PAIN
PAIN_FUNCTIONAL_ASSESSMENT: PREVENTS OR INTERFERES SOME ACTIVE ACTIVITIES AND ADLS
PAIN_FUNCTIONAL_ASSESSMENT: NONE - DENIES PAIN
PAIN_FUNCTIONAL_ASSESSMENT: 0-10
PAIN_FUNCTIONAL_ASSESSMENT: PREVENTS OR INTERFERES SOME ACTIVE ACTIVITIES AND ADLS

## 2025-06-03 NOTE — ANESTHESIA POSTPROCEDURE EVALUATION
Department of Anesthesiology  Postprocedure Note    Patient: June Matthews  MRN: [id number]  YOB: 1985  Date of evaluation: 6/3/2025    Procedure Summary       Date: 06/03/25 Room / Location: [address]    Anesthesia Start: 0801 Anesthesia Stop: 1004    Procedure: LEFT ACHILLES RUPTURE PRIMARY REPAIR +PNB+ (Left: Foot) Diagnosis:       Spontaneous rupture of flexor tendon of foot, unspecified laterality      (Spontaneous rupture of flexor tendon of foot, unspecified laterality [M66.379])    Surgeons: Yeison Ann DPM Responsible Provider: Myriam Landry MD    Anesthesia Type: general, regional ASA Status: 2            Anesthesia Type: No value filed.    Mohinder Phase I: Mohinder Score: 10    Mohinder Phase II:      Anesthesia Post Evaluation    Patient location during evaluation: PACU  Patient participation: complete - patient participated  Level of consciousness: awake and alert  Pain score: 0  Airway patency: patent  Nausea & Vomiting: no vomiting and no nausea  Cardiovascular status: hemodynamically stable  Respiratory status: spontaneous ventilation, nonlabored ventilation and acceptable  Hydration status: stable  Comments: Patient had chest pain in PACU, that responded to nitroglycerin.  EKG showed sinus michael.  Cardiology consulted (see notes)  Multimodal analgesia pain management approach  Pain management: adequate and satisfactory to patient        No notable events documented.

## 2025-06-03 NOTE — PROGRESS NOTES
Patient arrived to PACU, placed on monitor. Patient on simple facemask with oral airway in place.  Vital signs stable. Patient SB on monitor.  HG

## 2025-06-03 NOTE — PROGRESS NOTES
Patient awake.  Complaining of pain and pressure \"like a hug\" across his chest and down his right arm.  Dr. Landry at bedside and notified.  HG

## 2025-06-03 NOTE — ANESTHESIA PROCEDURE NOTES
Peripheral Block    Patient location during procedure: pre-op  Reason for block: post-op pain management and at surgeon's request  Start time: 6/3/2025 6:34 AM  End time: 6/3/2025 6:36 AM  Staffing  Performed: anesthesiologist   Anesthesiologist: Evan Castillo DO  Performed by: Evan Castillo DO  Authorized by: Evan Castillo DO    Preanesthetic Checklist  Completed: patient identified, IV checked, site marked, risks and benefits discussed, surgical/procedural consents, equipment checked, pre-op evaluation, timeout performed, anesthesia consent given, oxygen available and monitors applied/VS acknowledged  Peripheral Block   Patient position: supine  Prep: ChloraPrep  Provider prep: sterile gloves and mask  Patient monitoring: IV access, frequent blood pressure checks, continuous pulse ox and cardiac monitor  Block type: Sciatic  Popliteal  Laterality: left  Injection technique: single-shot  Guidance: ultrasound guided    Needle   Needle type: combined needle/nerve stimulator   Needle gauge: 22 G  Needle localization: anatomical landmarks and ultrasound guidance  Needle length: 10 cm  Assessment   Injection assessment: negative aspiration for heme, no paresthesia on injection and local visualized surrounding nerve on ultrasound  Paresthesia pain: none  Slow fractionated injection: yes  Hemodynamics: stable  Outcomes: uncomplicated and patient tolerated procedure well    Additional Notes  Negative aspiration Q5cc.  Needle completely visualized throughout.  Medications Administered  ropivacaine (NAROPIN) injection 0.5% - Perineural   20 mL - 6/3/2025 6:35:00 AM

## 2025-06-03 NOTE — CONSULTS
CHIEF COMPLAINT: Chest pain    HISTORY OF PRESENT ILLNESS: Patient is a 40 y.o. male seen at the request of Abdiaziz Wooten DO and not followed by cardiology.      Patient complains of chest pain. Onset was 2 hours ago, with resolved course since that time. The patient describes the pain as intermittent, precordial in nature, does not radiate. Patient rates pain as a moderate in intensity.  Associated symptoms are chest pain and dyspnea. Aggravating factors are none.  Alleviating factors are: none.     Patient with CP post-op. No prior cardiac history. Minimal risk factors.     Symptoms have resolved.     Past Medical History:   Diagnosis Date    Achilles tendon rupture     LEFT    Chronic tension headaches     Migraine        Patient Active Problem List   Diagnosis    Headache    Migraine    Status migrainosus    Chronic daily headache       No Known Allergies    Current Facility-Administered Medications   Medication Dose Route Frequency Provider Last Rate Last Admin    ROPivacaine (NAROPIN) 0.5% injection 30 mL  30 mL Infiltration Once Myriam Landry MD        naloxone 0.4 mg in 10 mL sodium chloride syringe   IntraVENous PRN Evan Castillo T, DO        sodium chloride flush 0.9 % injection 5-40 mL  5-40 mL IntraVENous 2 times per day Evan Castillo T, DO        sodium chloride flush 0.9 % injection 5-40 mL  5-40 mL IntraVENous PRN Evan Castillo T, DO        0.9 % sodium chloride infusion   IntraVENous PRN Evan Castillo T, DO        meperidine (DEMEROL) injection 12.5 mg  12.5 mg IntraVENous Once Evan Castillo T, DO        fentaNYL (SUBLIMAZE) injection 25 mcg  25 mcg IntraVENous Q5 Min PRN Evan Castillo T, DO        HYDROmorphone (DILAUDID) injection 0.5 mg  0.5 mg IntraVENous Q5 Min PRN Evan Castillo T, DO        prochlorperazine (COMPAZINE) injection 5 mg  5 mg IntraVENous Once PRN Evan Castillo T, DO        diphenhydrAMINE (BENADRYL) injection 12.5 mg  12.5 mg IntraVENous Once

## 2025-06-03 NOTE — PROGRESS NOTES
Discharge instructions gone over with patient, medications reviewed with patient. All questions answered, patient verbalized understanding. Pt has transportation home with family and will have adult supervision. After anesthesia instructions gone over and patient verbalized understanding.   Pt discharged home with family via WC with staff.

## 2025-06-03 NOTE — ANESTHESIA PROCEDURE NOTES
Peripheral Block    Patient location during procedure: procedure area  Reason for block: post-op pain management and at surgeon's request  Start time: 6/3/2025 6:36 AM  End time: 6/3/2025 6:38 AM  Staffing  Performed: anesthesiologist   Anesthesiologist: Evan Castillo DO  Performed by: Evan Castillo DO  Authorized by: Evan Castillo DO    Preanesthetic Checklist  Completed: patient identified, IV checked, site marked, risks and benefits discussed, surgical/procedural consents, equipment checked, pre-op evaluation, timeout performed, anesthesia consent given, oxygen available and monitors applied/VS acknowledged  Peripheral Block   Patient position: supine  Prep: ChloraPrep  Provider prep: sterile gloves and mask  Patient monitoring: continuous pulse ox, cardiac monitor and IV access  Block type: Femoral  Adductor canal  Laterality: left  Injection technique: single-shot  Guidance: ultrasound guided    Needle   Needle type: combined needle/nerve stimulator   Needle gauge: 22 G  Needle localization: anatomical landmarks and ultrasound guidance  Needle length: 10 cm  Assessment   Injection assessment: negative aspiration for heme, no paresthesia on injection and local visualized surrounding nerve on ultrasound  Paresthesia pain: none  Slow fractionated injection: yes  Hemodynamics: stable  Outcomes: uncomplicated and patient tolerated procedure well    Additional Notes  Negative aspiration Q5cc.  Needle completely visualized throughout.  Medications Administered  ropivacaine (NAROPIN) injection 0.5% - Perineural   25 mL - 6/3/2025 6:37:00 AM

## 2025-06-03 NOTE — ANESTHESIA PRE PROCEDURE
Department of Anesthesiology  Preprocedure Note       Name:  June Matthews   Age:  40 y.o.  :  1985                                          MRN:  [id number]         Date:  6/3/2025      Surgeon: Surgeon(s):  Yeison Ann DPM    Procedure: Procedure(s):  LEFT ACHILLES RUPTURE PRIMARY REPAIR     ++ARTHREX++     ++PNB++    Medications prior to admission:   Prior to Admission medications    Medication Sig Start Date End Date Taking? Authorizing Provider   HYDROcodone-acetaminophen (NORCO) 5-325 MG per tablet Take 1 tablet by mouth every 8 hours as needed for Pain.   Yes Provider, MD Mariia   butalbital-acetaminophen-caffeine (FIORICET, ESGIC) -40 MG per tablet Take 1 tablet by mouth every 4 hours as needed for Headaches  Patient not taking: Reported on 2025 10/22/22   Maria Guadalupe Jurado PA-C   ketorolac (TORADOL) 10 MG tablet Take 1 tablet by mouth every 8 hours as needed for Pain  Patient not taking: Reported on 2025   Danny Huber MD   SUMAtriptan (IMITREX) 100 MG tablet Take 1 tablet by mouth once as needed for Migraine  Patient not taking: Reported on 2025  Alex Rooney Jr., MD       Current medications:    Current Facility-Administered Medications   Medication Dose Route Frequency Provider Last Rate Last Admin    ceFAZolin (ANCEF) 2,000 mg in sterile water 20 mL IV syringe  2,000 mg IntraVENous q8h Yeison Ann DPM        midazolam PF (VERSED) injection 1 mg  1 mg IntraVENous Once Myriam Landry MD        fentaNYL (SUBLIMAZE) injection 50 mcg  50 mcg IntraVENous Once Myriam Landry MD        ROPivacaine (NAROPIN) 0.5% injection 30 mL  30 mL Infiltration Once Myriam Landry MD           Allergies:  No Known Allergies    Problem List:    Patient Active Problem List   Diagnosis Code    Headache R51.9    Migraine G43.909    Status migrainosus G43.901    Chronic daily headache R51.9       Past Medical History:        Diagnosis Date    Achilles

## 2025-06-03 NOTE — OP NOTE
and the Achilles rupture identified which was noted to be a full-thickness tear.  Hematoma and fibrotic tissue within the rupture site were carefully excised.  The frayed tendon ends to the proximal and distal tendon stumps were sharply excised back to healthy appearing tendon.  Attention was then directed to the proximal stump of the Achilles which was then clamped using two Allis clamps.  The jig for the Arthrex PARS system was applied within the paratenon on either side of the proximal Achilles.  Then according to the standard operative technique, sutures were passed percutaneously through the jig from medial to lateral to appropriately tag the proximal aspect of the Achilles tendon.  The jig was then removed.  Care was taken to pull on all sutures to remove any creep and the proximal Achilles was noted to be securely tagged.  Next, through two small stab incisions just medial and lateral to the Achilles insertion at the posterior heel, drilling and tapping for the SwiveLock anchors was performed within the posterior calcaneus.  The sutures were passed percutaneously through the distal aspect of the Achilles using a suture lasso again according to standard operative technique.  Care was taken to again pull on all sutures in order to remove any creep.  With the ankle held under maximal plantar flexion, the suture strands on each side were then secured by inserting two 3.9 mm SwiveLock anchors into the posterior calcaneus.  Care was taken to slightly countersink the anchors below the near cortex.  The suture strands were cut flush.  Good apposition of the proximal and distal Achilles was noted.  Intraoperative Rhodes's testing performed at this time was negative.  Attention was directed back to the Achilles rupture site where the repair was further reinforced using multiple interrupted 2-0 Vicryl stitches.  The paratenon and subcutaneous layer was closed using interrupted 3-0 Vicryl.  Tensionless skin closure of  all incision sites was performed using interrupted 3-0 nylon.    A well-padded multilayered dry sterile compressive dressing was applied to the operative foot and ankle.  The tourniquet was deflated.  Next, a well-padded short-leg nonweightbearing fiberglass splint was applied to the operative extremity with the ankle held in relative plantarflexion and the Achilles repair under physiologic tension.     The patient tolerated the procedure and anesthesia well without any apparent intra-operative complications.      Condition:  The patient was transferred from the operating room to the recovery unit in stable condition and vascular status intact to the operative lower extremity.  The patient was then monitored to be discharged home per the recovery unit protocol.    Post-operative Plan:  - Keep splint/dressing clean, dry, intact  - Non weightbearing to operative lower extremity  - Ice/elevate  - Pain control  - DVT prophylaxis: Aspirin 81mg once a day, starting post-op day #1  - Follow-up in 1-2 weeks

## 2025-06-03 NOTE — PROGRESS NOTES
Updated significant other in waiting room.  Aware of cardiology consult and work up being done.  HG

## (undated) DEVICE — PADDING,UNDERCAST,COTTON, 4"X4YD STERILE: Brand: MEDLINE

## (undated) DEVICE — BANDAGE,GAUZE,BULKEE II,4.5"X4.1YD,STRL: Brand: MEDLINE

## (undated) DEVICE — DRAPE,EXTREMITY,89X128,STERILE: Brand: MEDLINE

## (undated) DEVICE — GLOVE ORANGE PI 7 1/2   MSG9075

## (undated) DEVICE — NEEDLE HYPO 23GA L1.5IN TURQ POLYPR HUB S STL THN WALL IM

## (undated) DEVICE — SYRINGE MED 30ML STD CLR PLAS LUERLOCK TIP N CTRL DISP

## (undated) DEVICE — PAD,ABDOMINAL,5"X9",ST,LF,25/BX: Brand: MEDLINE INDUSTRIES, INC.

## (undated) DEVICE — BANDAGE COMPR W6INXL12FT SMOOTH FOR LIMB EXSANG ESMARCH

## (undated) DEVICE — 3M™ COBAN™ NL STERILE NON-LATEX SELF-ADHERENT WRAP, 2084S, 4 IN X 5 YD (10 CM X 4,5 M), 18 ROLLS/CASE: Brand: 3M™ COBAN™

## (undated) DEVICE — MARKER,SKIN,WI/RULER AND LABELS: Brand: MEDLINE

## (undated) DEVICE — STOCKINETTE,DOUBLE PLY,6X48,STERILE: Brand: MEDLINE

## (undated) DEVICE — ELECTRODE ELECSURG 2 PLATE AD 10 FT 33 LB PT RET MEGADYNE

## (undated) DEVICE — SPLINT ORTH 4INX15FT FBRGLS PD INTLOK PERF TECHNOLOGY

## (undated) DEVICE — DRAPE C ARM W41XL74IN UNIV MOB W RUBBERBAND CLP

## (undated) DEVICE — GLOVE SURG SZ 8 L12IN FNGR THK79MIL GRN LTX FREE

## (undated) DEVICE — TOWEL,OR,DSP,ST,BLUE,STD,6/PK,12PK/CS: Brand: MEDLINE

## (undated) DEVICE — DOUBLE BASIN SET: Brand: MEDLINE INDUSTRIES, INC.

## (undated) DEVICE — BUR SURG L70MM HD L8MM DIA4MM STR SHANK 235MM 8 FLUT MIC

## (undated) DEVICE — APPLICATOR MEDICATED 26 CC SOLUTION HI LT ORNG CHLORAPREP

## (undated) DEVICE — GAUZE,SPONGE,4"X4",8PLY,STRL,LF,10/TRAY: Brand: MEDLINE

## (undated) DEVICE — 4-PORT MANIFOLD: Brand: NEPTUNE 2

## (undated) DEVICE — Device

## (undated) DEVICE — CLOTH PREP W7.5XL7.5IN 2% CHG SKIN ALC AND RNS FREE

## (undated) DEVICE — SOLUTION IRRIG 1000ML 0.9% SOD CHL USP POUR PLAS BTL

## (undated) DEVICE — BANDAGE COMPR W6INXL10YD ST M E WHITE/BEIGE

## (undated) DEVICE — BLADE,STAINLESS-STEEL,11,STRL,DISPOSABLE: Brand: MEDLINE

## (undated) DEVICE — BLADE,STAINLESS-STEEL,10,STRL,DISPOSABLE: Brand: MEDLINE